# Patient Record
Sex: MALE | Race: WHITE | NOT HISPANIC OR LATINO
[De-identification: names, ages, dates, MRNs, and addresses within clinical notes are randomized per-mention and may not be internally consistent; named-entity substitution may affect disease eponyms.]

---
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
## 2018-03-02 PROBLEM — S46.911D STRAIN OF RIGHT SHOULDER, SUBSEQUENT ENCOUNTER: Status: ACTIVE | Noted: 2018-03-02

## 2018-07-13 PROCEDURE — 87491 CHLMYD TRACH DNA AMP PROBE: CPT | Performed by: PEDIATRICS

## 2018-07-13 PROCEDURE — 87591 N.GONORRHOEAE DNA AMP PROB: CPT | Performed by: PEDIATRICS

## 2018-07-13 PROCEDURE — 86780 TREPONEMA PALLIDUM: CPT | Performed by: PEDIATRICS

## 2018-07-13 PROCEDURE — 87389 HIV-1 AG W/HIV-1&-2 AB AG IA: CPT | Performed by: PEDIATRICS

## 2019-09-17 ENCOUNTER — EMERGENCY (EMERGENCY)
Facility: HOSPITAL | Age: 19
LOS: 1 days | Discharge: HOME | End: 2019-09-17
Attending: EMERGENCY MEDICINE | Admitting: EMERGENCY MEDICINE
Payer: COMMERCIAL

## 2019-09-17 VITALS
DIASTOLIC BLOOD PRESSURE: 86 MMHG | RESPIRATION RATE: 18 BRPM | TEMPERATURE: 97 F | HEART RATE: 96 BPM | SYSTOLIC BLOOD PRESSURE: 119 MMHG | OXYGEN SATURATION: 100 %

## 2019-09-17 VITALS
RESPIRATION RATE: 20 BRPM | SYSTOLIC BLOOD PRESSURE: 123 MMHG | DIASTOLIC BLOOD PRESSURE: 85 MMHG | WEIGHT: 271.17 LBS | HEART RATE: 107 BPM | TEMPERATURE: 99 F | OXYGEN SATURATION: 99 %

## 2019-09-17 PROCEDURE — 73140 X-RAY EXAM OF FINGER(S): CPT | Mod: 26,LT

## 2019-09-17 PROCEDURE — 99283 EMERGENCY DEPT VISIT LOW MDM: CPT | Mod: 25

## 2019-09-17 PROCEDURE — 99283 EMERGENCY DEPT VISIT LOW MDM: CPT

## 2019-09-17 PROCEDURE — 73140 X-RAY EXAM OF FINGER(S): CPT

## 2019-09-17 RX ORDER — BACITRACIN ZINC 500 UNIT/G
1 OINTMENT IN PACKET (EA) TOPICAL ONCE
Refills: 0 | Status: COMPLETED | OUTPATIENT
Start: 2019-09-17 | End: 2019-09-17

## 2019-09-17 RX ORDER — IBUPROFEN 200 MG
600 TABLET ORAL ONCE
Refills: 0 | Status: COMPLETED | OUTPATIENT
Start: 2019-09-17 | End: 2019-09-17

## 2019-09-17 RX ADMIN — Medication 600 MILLIGRAM(S): at 18:24

## 2019-09-17 RX ADMIN — Medication 1 APPLICATION(S): at 18:43

## 2019-09-17 NOTE — ED PROVIDER NOTE - OBJECTIVE STATEMENT
19 y male accompanied to ED by sister, presents with left 3rd finger injury, states bathroom door struck his finger tip today,  has skin avulsion above cuticle, from, no active bleeding.  right hand dominant, utd on tetanus.  took no pain medication before coming to ED .  PMd Dr Samayoa  nonsmoker

## 2019-09-17 NOTE — ED PROVIDER NOTE - PATIENT PORTAL LINK FT
You can access the FollowMyHealth Patient Portal offered by [facility] by registering at the following website: [email or web address]. By joining [facility]’s FollowMyHealth portal, you will also be able to view your health information using other applications (apps) compatible with our system.

## 2019-09-17 NOTE — ED ADULT NURSE NOTE - OBJECTIVE STATEMENT
18 y/o male comes in [facility] from home with complaints of left hand 3rd finger injury. States he was at school and his left 3rd finger got slammed in a door causing a laceration. Denies pain, nausea or vomiting. Plan of care discussed with patient. Comfort and safety maintained. Will continue to monitor.

## 2019-09-17 NOTE — ED ADULT NURSE NOTE - NURSING SKIN WOUND LOCATION #1
Progress Notes by Amanuel Bello MD at 06/29/17 12:31 PM     Author:  Amanuel Bello MD Service:  (none) Author Type:  Physician     Filed:  06/29/17 04:00 PM Encounter Date:  6/29/2017 Status:  Signed     :  Amanuel Bello MD (Physician)            CC:   Chief Complaint     Patient presents with     • Musculoskeletal Problem      left foot pain x 1 wk.  unsure if he injured his foot.[RP1.1T]        Established[RP1.1M] patient    SUBJECTIVE  HPI:Luke Gupta is a 82 year old male who presents today with[RP1.1T] left foot pain.  Been present over the last 1 week.  He does not recall specific injury.  It is worse when he is bearing weight.  Improves with rest.  He is not take anything for symptom relief.[RP1.1M]    His past medical history is significant for:  Past Medical History:     Diagnosis  Date   • Arthritis    • ASTIGMATISM NOS 12/18/1998   • EPIPHORA NOS 1/27/2005   • Fracture of acromion of scapula 11/16/2014   • Pinguecula 1/27/2005   • Shoulder dislocation 12/19/2014   • Stiffness of joint, not elsewhere classified,  shoulder region 1/27/2015       Allergies: Review of patient's allergies indicates no known allergies.    Current Outpatient Prescriptions     Medication  Sig   • Meclizine HCl 12.5 MG TABS Take 1-2 Tabs by mouth 3 (three) times daily. for dizziness   • RANITIDINE  MG OR TABS 1 tab q hs   • HYDROCHLOROTHIAZIDE 25 MG OR TABS 1/2 tab qd   • VALSARTAN 160 MG OR TABS 1/2 tab qd         Review of Systems:    All other systems reviewed are negative    OBJECTIVE  Vitals: /60  Pulse 66  Temp 98 °F (36.7 °C) (Tympanic)  Resp 16  SpO2 99%    Physical Exam:[RP1.1T]  Left foot: There is no obvious bruising swelling redness or deformity.  Patient is some tenderness over the dorsal medial aspect of the foot.  He has a normal dorsal pedal pulse.  His normal capillary refill and sensation of all digits of the foot.  He has no tenderness over the ankle.  He has no tenderness over the  Achilles tendon.    Independently ordered and reviewed an x-ray of the right foot.  Shows no acute fracture or bony abnormality[RP1.1M]    ASSESSMENT/PLAN[RP1.1T]  Foot pain likely secondary to strain.  Had discussion with patient regarding his condition.  At this point recommend close observation symptom and therapy.  He should ice elevate and rest the foot.  Tylenol for pain.  Should see gradual improvement.  If not improving follow-up with his primary doctor.[RP1.1M]  If symptoms should suddenly change or worsen, seek immediate reevaluation with PCP or return to Immediate Care.  The patient verbalized understanding.    Electronically Signed by:    Amanuel Bello MD , 6/29/2017[RP1.1T]        Revision History        User Key Date/Time User Provider Type Action    > RP1.1 06/29/17 04:00 PM Amanuel Bello MD Physician Sign    M - Manual, T - Template             finger

## 2019-09-17 NOTE — ED PROVIDER NOTE - ATTENDING CONTRIBUTION TO CARE
pt c/o injury to distal aspect left middle finger s/p injured by closing door. no other injury. tetanus utd.  wd, wn male, nad, left 3rd finger tender over distal phalanx, skin avulsion prox to cuticle, laceration to nail, distal sensation intact, cap refill<2secs

## 2019-09-17 NOTE — ED PROVIDER NOTE - PROGRESS NOTE DETAILS
bacitracin dsd, finger splint applied to left 3rd finger, given information for Dr Tse, xray no obvious fx,  otc tylenol or motrin as directed for pain

## 2020-01-17 ENCOUNTER — APPOINTMENT (OUTPATIENT)
Dept: OTOLARYNGOLOGY | Facility: CLINIC | Age: 20
End: 2020-01-17
Payer: COMMERCIAL

## 2020-01-17 VITALS
HEART RATE: 87 BPM | BODY MASS INDEX: 30.8 KG/M2 | HEIGHT: 74 IN | SYSTOLIC BLOOD PRESSURE: 126 MMHG | WEIGHT: 240 LBS | DIASTOLIC BLOOD PRESSURE: 84 MMHG

## 2020-01-17 DIAGNOSIS — J32.9 CHRONIC SINUSITIS, UNSPECIFIED: ICD-10-CM

## 2020-01-17 DIAGNOSIS — J33.9 NASAL POLYP, UNSPECIFIED: ICD-10-CM

## 2020-01-17 PROCEDURE — 99203 OFFICE O/P NEW LOW 30 MIN: CPT | Mod: 25

## 2020-01-17 PROCEDURE — 31231 NASAL ENDOSCOPY DX: CPT

## 2020-01-17 RX ORDER — FLUTICASONE PROPIONATE 50 UG/1
50 SPRAY, METERED NASAL
Qty: 1 | Refills: 4 | Status: ACTIVE | COMMUNITY
Start: 2020-01-17 | End: 1900-01-01

## 2020-01-17 NOTE — HISTORY OF PRESENT ILLNESS
[de-identified] : 19M referred by Dr. Landis ([facility]) for sinonasal evaluation. PMH asthma. Hx craniotomy for chiari malformation with Dr. Merida in 2013. Reports severe recurrent sinus infections- estimates 10-12/year. Some improve with antibiotics, other linger for 2-3wk and require a second course. These sinus infections are characterized by severe nasal congestion, bad postnasal drip, anterior rhinorrhea that ranges between a clear and yellow coloration. Denies facial pain/pressure. Some impairment in regard to sense of smell, can only smell strong odors. At baseline, postnasal drip is present. Has tried flonase and nasal saline but he has a hard time tolerating.\par \par He recently Dr. Del Valle for an infection on 12/2 and he recommended a second course of antibiotics which helped improve his symptoms. At that time, there was inflamed L MT with polypoid degeneration.\par \par CT scan from 2012 demonstrated L sphenoethmoid sinusitis. No more recent sinus imaging.

## 2020-01-17 NOTE — PHYSICAL EXAM
[FreeTextEntry1] : overweight [Nasal Endoscopy Performed] : nasal endoscopy was performed, see procedure section for findings [Normal] : orientation to person, place, and time: normal

## 2020-01-17 NOTE — REVIEW OF SYSTEMS
[Heartburn] : heartburn [Hearing Loss] : hearing loss [Negative] : Heme/Lymph [de-identified] : Headache

## 2020-03-06 ENCOUNTER — APPOINTMENT (OUTPATIENT)
Dept: OTOLARYNGOLOGY | Facility: CLINIC | Age: 20
End: 2020-03-06

## 2020-08-11 PROBLEM — S46.911D STRAIN OF RIGHT SHOULDER, SUBSEQUENT ENCOUNTER: Status: RESOLVED | Noted: 2018-03-02 | Resolved: 2020-08-11

## 2021-07-12 ENCOUNTER — LAB REQUISITION (OUTPATIENT)
Dept: LAB | Age: 21
End: 2021-07-12

## 2021-07-12 ENCOUNTER — IMAGING SERVICES (OUTPATIENT)
Dept: GENERAL RADIOLOGY | Age: 21
End: 2021-07-12
Attending: PHYSICIAN ASSISTANT

## 2021-07-12 DIAGNOSIS — Z02.1 PRE-EMPLOYMENT HEALTH SCREENING EXAMINATION: Primary | ICD-10-CM

## 2021-07-12 DIAGNOSIS — Z02.1 ENCOUNTER FOR PRE-EMPLOYMENT EXAMINATION: ICD-10-CM

## 2021-07-12 DIAGNOSIS — Z02.1 PRE-EMPLOYMENT HEALTH SCREENING EXAMINATION: ICD-10-CM

## 2021-07-12 PROCEDURE — 86480 TB TEST CELL IMMUN MEASURE: CPT | Performed by: CLINICAL MEDICAL LABORATORY

## 2021-07-12 PROCEDURE — PSEU9049 QUANTIFERON TB PLUS: Performed by: CLINICAL MEDICAL LABORATORY

## 2021-07-12 PROCEDURE — 71046 X-RAY EXAM CHEST 2 VIEWS: CPT | Performed by: RADIOLOGY

## 2021-07-14 LAB
GAMMA INTERFERON BACKGROUND BLD IA-ACNC: 0.13 IU/ML
M TB IFN-G BLD-IMP: NEGATIVE
M TB IFN-G CD4+ BCKGRND COR BLD-ACNC: 0 IU/ML
M TB IFN-G CD4+CD8+ BCKGRND COR BLD-ACNC: 0 IU/ML
MITOGEN IGNF BCKGRD COR BLD-ACNC: >10 IU/ML

## 2022-01-05 ENCOUNTER — APPOINTMENT (OUTPATIENT)
Dept: DERMATOLOGY | Facility: CLINIC | Age: 22
End: 2022-01-05
Payer: COMMERCIAL

## 2022-01-05 VITALS — HEIGHT: 74 IN | BODY MASS INDEX: 38.26 KG/M2 | WEIGHT: 298.13 LBS

## 2022-01-05 DIAGNOSIS — L24.89 IRRITANT CONTACT DERMATITIS DUE TO OTHER AGENTS: ICD-10-CM

## 2022-01-05 PROCEDURE — 99203 OFFICE O/P NEW LOW 30 MIN: CPT

## 2022-01-05 RX ORDER — CLOBETASOL PROPIONATE 0.5 MG/G
0.05 OINTMENT TOPICAL
Qty: 1 | Refills: 0 | Status: ACTIVE | COMMUNITY
Start: 2022-01-05 | End: 1900-01-01

## 2023-10-31 NOTE — ED PROVIDER NOTE - CARE PLAN
Principal Discharge DX:	Finger injury, left, initial encounter Island Pedicle Flap-Requiring Vessel Identification Text: The defect edges were debeveled with a #15 scalpel blade.  Given the location of the defect, shape of the defect and the proximity to free margins an island pedicle advancement flap was deemed most appropriate.  Using a sterile surgical marker, an appropriate advancement flap was drawn, based on the axial vessel mentioned above, incorporating the defect, outlining the appropriate donor tissue and placing the expected incisions within the relaxed skin tension lines where possible.    The area thus outlined was incised deep to adipose tissue with a #15 scalpel blade.  The skin margins were undermined to an appropriate distance in all directions around the primary defect and laterally outward around the island pedicle utilizing iris scissors.  There was minimal undermining beneath the pedicle flap.

## 2024-01-17 ENCOUNTER — OUTPATIENT (OUTPATIENT)
Dept: OUTPATIENT SERVICES | Facility: HOSPITAL | Age: 24
LOS: 1 days | End: 2024-01-17
Payer: COMMERCIAL

## 2024-01-17 VITALS
DIASTOLIC BLOOD PRESSURE: 84 MMHG | HEART RATE: 68 BPM | TEMPERATURE: 98 F | HEIGHT: 72 IN | OXYGEN SATURATION: 97 % | RESPIRATION RATE: 18 BRPM | WEIGHT: 275.8 LBS | SYSTOLIC BLOOD PRESSURE: 113 MMHG

## 2024-01-17 DIAGNOSIS — J32.1 CHRONIC FRONTAL SINUSITIS: ICD-10-CM

## 2024-01-17 DIAGNOSIS — Z90.89 ACQUIRED ABSENCE OF OTHER ORGANS: Chronic | ICD-10-CM

## 2024-01-17 DIAGNOSIS — J32.9 CHRONIC SINUSITIS, UNSPECIFIED: ICD-10-CM

## 2024-01-17 DIAGNOSIS — J32.0 CHRONIC MAXILLARY SINUSITIS: ICD-10-CM

## 2024-01-17 DIAGNOSIS — Z98.890 OTHER SPECIFIED POSTPROCEDURAL STATES: Chronic | ICD-10-CM

## 2024-01-17 LAB
ANION GAP SERPL CALC-SCNC: 12 MMOL/L — SIGNIFICANT CHANGE UP (ref 5–17)
BUN SERPL-MCNC: 15 MG/DL — SIGNIFICANT CHANGE UP (ref 7–23)
CALCIUM SERPL-MCNC: 9.7 MG/DL — SIGNIFICANT CHANGE UP (ref 8.4–10.5)
CHLORIDE SERPL-SCNC: 103 MMOL/L — SIGNIFICANT CHANGE UP (ref 96–108)
CO2 SERPL-SCNC: 25 MMOL/L — SIGNIFICANT CHANGE UP (ref 22–31)
CREAT SERPL-MCNC: 0.78 MG/DL — SIGNIFICANT CHANGE UP (ref 0.5–1.3)
EGFR: 127 ML/MIN/1.73M2 — SIGNIFICANT CHANGE UP
GLUCOSE SERPL-MCNC: 83 MG/DL — SIGNIFICANT CHANGE UP (ref 70–99)
HCT VFR BLD CALC: 40.7 % — SIGNIFICANT CHANGE UP (ref 39–50)
HGB BLD-MCNC: 13.9 G/DL — SIGNIFICANT CHANGE UP (ref 13–17)
MCHC RBC-ENTMCNC: 30 PG — SIGNIFICANT CHANGE UP (ref 27–34)
MCHC RBC-ENTMCNC: 34.2 GM/DL — SIGNIFICANT CHANGE UP (ref 32–36)
MCV RBC AUTO: 87.7 FL — SIGNIFICANT CHANGE UP (ref 80–100)
NRBC # BLD: 0 /100 WBCS — SIGNIFICANT CHANGE UP (ref 0–0)
PLATELET # BLD AUTO: 274 K/UL — SIGNIFICANT CHANGE UP (ref 150–400)
POTASSIUM SERPL-MCNC: 4 MMOL/L — SIGNIFICANT CHANGE UP (ref 3.5–5.3)
POTASSIUM SERPL-SCNC: 4 MMOL/L — SIGNIFICANT CHANGE UP (ref 3.5–5.3)
RBC # BLD: 4.64 M/UL — SIGNIFICANT CHANGE UP (ref 4.2–5.8)
RBC # FLD: 12.3 % — SIGNIFICANT CHANGE UP (ref 10.3–14.5)
SODIUM SERPL-SCNC: 140 MMOL/L — SIGNIFICANT CHANGE UP (ref 135–145)
WBC # BLD: 8.01 K/UL — SIGNIFICANT CHANGE UP (ref 3.8–10.5)
WBC # FLD AUTO: 8.01 K/UL — SIGNIFICANT CHANGE UP (ref 3.8–10.5)

## 2024-01-17 PROCEDURE — 85027 COMPLETE CBC AUTOMATED: CPT

## 2024-01-17 PROCEDURE — 80048 BASIC METABOLIC PNL TOTAL CA: CPT

## 2024-01-17 PROCEDURE — G0463: CPT

## 2024-01-17 RX ORDER — OMEPRAZOLE 10 MG/1
0 CAPSULE, DELAYED RELEASE ORAL
Qty: 0 | Refills: 0 | DISCHARGE

## 2024-01-17 RX ORDER — SODIUM CHLORIDE 9 MG/ML
3 INJECTION INTRAMUSCULAR; INTRAVENOUS; SUBCUTANEOUS EVERY 8 HOURS
Refills: 0 | Status: DISCONTINUED | OUTPATIENT
Start: 2024-02-06 | End: 2024-02-20

## 2024-01-17 RX ORDER — SODIUM CHLORIDE 9 MG/ML
1000 INJECTION, SOLUTION INTRAVENOUS
Refills: 0 | Status: DISCONTINUED | OUTPATIENT
Start: 2024-02-06 | End: 2024-02-20

## 2024-01-17 NOTE — H&P PST ADULT - NSICDXFAMILYHX_GEN_ALL_CORE_FT
FAMILY HISTORY:  Father  Still living? Yes, Estimated age: Age Unknown  FH: aortic aneurysm, Age at diagnosis: Age Unknown    Mother  Still living? Yes, Estimated age: Age Unknown  FH: type 2 diabetes mellitus, Age at diagnosis: Age Unknown    Grandparent  Still living? No  FH: brain cancer, Age at diagnosis: Age Unknown

## 2024-01-17 NOTE — H&P PST ADULT - PROBLEM SELECTOR PLAN 1
Plan for B/L turbinectomy, B/L maxillary antrostomy w/tissue removal, B/L total ethmoidectomy, B/L excision serena bullosa, B/L balloon dilation of frontal sinuses, med fusion, scope on 2/6/24 with Dr. Landis.   PST labs sent, CBC, BMP  Pre procedure instructions discussed  Pre-op education provided - all questions answered

## 2024-01-17 NOTE — H&P PST ADULT - HISTORY OF PRESENT ILLNESS
Pt is a 24 yo M with PMH autism, asthma, depression, migraines, craniotomy for Chiari malformation with Dr. Merida in 2013, and severe recurrent sinus infections for 10-12 years, treated with multiple course of abx.  Last sinus infection about 2 months.  Sinus infections are characterized by severe nasal congestion, bad postnasal drip, anterior rhinorrhea that ranges between a clear and yellow coloration, impaired sense of smell.  Plan for B/L turbinectomy, B/L maxillary antrostomy w/tissue removal, B/L total ethmoidectomy, B/L excision serena bullosa, B/L balloon dilation of frontal sinuses, med fusion, scope on 2/6/24 with Dr. Landis.   Patient denies any fever, cough, sob, flu like symptoms or travel outside of the [place] in the past 30 days  Pt is a 22 yo M with PMH autism, asthma, depression, migraines, obesity (BMI 37), craniotomy for Chiari malformation with Dr. Merida in 2013, and severe recurrent sinus infections for 10-12 years, treated with multiple course of abx.  Last sinus infection about 2 months.  Sinus infections are characterized by severe nasal congestion, bad postnasal drip, anterior rhinorrhea that ranges between a clear and yellow coloration, impaired sense of smell.  Plan for B/L turbinectomy, B/L maxillary antrostomy w/tissue removal, B/L total ethmoidectomy, B/L excision serena bullosa, B/L balloon dilation of frontal sinuses, med fusion, scope on 2/6/24 with Dr. Landis.   Patient denies any fever, cough, sob, flu like symptoms or travel outside of the [place] in the past 30 days

## 2024-01-17 NOTE — H&P PST ADULT - MUSCULOSKELETAL
Problem: Adult Inpatient Plan of Care  Goal: Plan of Care Review  Outcome: Ongoing, Progressing  Goal: Patient-Specific Goal (Individualized)  Outcome: Ongoing, Progressing  Goal: Absence of Hospital-Acquired Illness or Injury  Outcome: Ongoing, Progressing  Goal: Optimal Comfort and Wellbeing  Outcome: Ongoing, Progressing  Goal: Readiness for Transition of Care  Outcome: Ongoing, Progressing     Problem: Fall Injury Risk  Goal: Absence of Fall and Fall-Related Injury  Outcome: Ongoing, Progressing     Problem: Skin Injury Risk Increased  Goal: Skin Health and Integrity  Outcome: Ongoing, Progressing     Problem: Impaired Wound Healing  Goal: Optimal Wound Healing  Outcome: Ongoing, Progressing     Problem: Oral Intake Inadequate  Goal: Improved Oral Intake  Outcome: Ongoing, Progressing     Problem: Pain Acute  Goal: Acceptable Pain Control and Functional Ability  Outcome: Ongoing, Progressing      details… ROM intact/normal gait/strength 5/5 bilateral upper extremities/strength 5/5 bilateral lower extremities/back exam

## 2024-01-17 NOTE — H&P PST ADULT - NSCAFFEAMTFREQ_GEN_ALL_CORE_SD
Date of service: 



12/17/2018



HISTORY:

 left leg pain.  



PRIORS:

02/02/2016. 



FINDINGS:

2-D, color and duplex Doppler analysis of the lower extremity venous 

circulation using routine protocol from the femoral veins through the 

popliteal veins.



Venous compressibility: Normal. 



Flow and augmentation patterns: Normal. 



Visualized veins upper third of calf: Normal. 



Baker cyst: None. 



There is an enlarged 2.0 x 0.7 x 0.6 cm left inguinal lymph node. 



IMPRESSION:

No sonographic or Doppler evidence for DVT in left lower extremity. 



Enlarged 2.0 cm left inguinal lymph node.  Clinical follow-up is 

advised.
1-2 cups/cans per day

## 2024-01-17 NOTE — H&P PST ADULT - ASSESSMENT
DASI score: 8.9  DASI activity: Performs ADLs, goes to grad school, works at [employer], lifts heavy boxes  Loose teeth or dentures: Denies  Airway: MP 2

## 2024-01-17 NOTE — H&P PST ADULT - NSICDXPASTMEDICALHX_GEN_ALL_CORE_FT
PAST MEDICAL HISTORY:  2019 novel coronavirus disease (COVID-19)     Active asthma     Anxiety and depression     Autism     Chronic eczema     Chronic sinusitis     GERD (gastroesophageal reflux disease)     History of Chiari malformation     Migraines

## 2024-02-05 ENCOUNTER — TRANSCRIPTION ENCOUNTER (OUTPATIENT)
Age: 24
End: 2024-02-05

## 2024-02-06 ENCOUNTER — OUTPATIENT (OUTPATIENT)
Dept: OUTPATIENT SERVICES | Facility: HOSPITAL | Age: 24
LOS: 1 days | End: 2024-02-06
Payer: COMMERCIAL

## 2024-02-06 ENCOUNTER — TRANSCRIPTION ENCOUNTER (OUTPATIENT)
Age: 24
End: 2024-02-06

## 2024-02-06 VITALS
RESPIRATION RATE: 18 BRPM | OXYGEN SATURATION: 99 % | DIASTOLIC BLOOD PRESSURE: 82 MMHG | HEART RATE: 70 BPM | SYSTOLIC BLOOD PRESSURE: 128 MMHG | HEIGHT: 72 IN | TEMPERATURE: 97 F | WEIGHT: 275.8 LBS

## 2024-02-06 VITALS
DIASTOLIC BLOOD PRESSURE: 71 MMHG | OXYGEN SATURATION: 96 % | RESPIRATION RATE: 18 BRPM | SYSTOLIC BLOOD PRESSURE: 129 MMHG | HEART RATE: 70 BPM

## 2024-02-06 DIAGNOSIS — J32.0 CHRONIC MAXILLARY SINUSITIS: ICD-10-CM

## 2024-02-06 DIAGNOSIS — Z90.89 ACQUIRED ABSENCE OF OTHER ORGANS: Chronic | ICD-10-CM

## 2024-02-06 DIAGNOSIS — J32.1 CHRONIC FRONTAL SINUSITIS: ICD-10-CM

## 2024-02-06 DIAGNOSIS — Z98.890 OTHER SPECIFIED POSTPROCEDURAL STATES: Chronic | ICD-10-CM

## 2024-02-06 PROCEDURE — C1889: CPT

## 2024-02-06 PROCEDURE — C9399: CPT

## 2024-02-06 PROCEDURE — 88305 TISSUE EXAM BY PATHOLOGIST: CPT

## 2024-02-06 PROCEDURE — 30140 RESECT INFERIOR TURBINATE: CPT | Mod: 50

## 2024-02-06 PROCEDURE — 61782 SCAN PROC CRANIAL EXTRA: CPT

## 2024-02-06 PROCEDURE — 31240 NSL/SNS NDSC CNCH BULL RESCJ: CPT | Mod: 50

## 2024-02-06 PROCEDURE — 31267 ENDOSCOPY MAXILLARY SINUS: CPT | Mod: RT

## 2024-02-06 PROCEDURE — 88305 TISSUE EXAM BY PATHOLOGIST: CPT | Mod: 26

## 2024-02-06 PROCEDURE — 31255 NSL/SINS NDSC W/TOT ETHMDCT: CPT | Mod: RT

## 2024-02-06 DEVICE — SURGIFOAM PAD 8CM X 12.5CM X 10MM (100): Type: IMPLANTABLE DEVICE | Status: FUNCTIONAL

## 2024-02-06 DEVICE — SURGIFLO MATRIX WITH THROMBIN KIT: Type: IMPLANTABLE DEVICE | Status: FUNCTIONAL

## 2024-02-06 RX ORDER — LIDOCAINE HCL 20 MG/ML
0.2 VIAL (ML) INJECTION ONCE
Refills: 0 | Status: COMPLETED | OUTPATIENT
Start: 2024-02-06 | End: 2024-02-06

## 2024-02-06 RX ORDER — ONDANSETRON 8 MG/1
4 TABLET, FILM COATED ORAL ONCE
Refills: 0 | Status: DISCONTINUED | OUTPATIENT
Start: 2024-02-06 | End: 2024-02-20

## 2024-02-06 RX ORDER — FLUTICASONE PROPIONATE 50 MCG
1 SPRAY, SUSPENSION NASAL
Refills: 0 | DISCHARGE

## 2024-02-06 RX ORDER — OXYCODONE HYDROCHLORIDE 5 MG/1
5 TABLET ORAL ONCE
Refills: 0 | Status: DISCONTINUED | OUTPATIENT
Start: 2024-02-06 | End: 2024-02-06

## 2024-02-06 RX ORDER — SUMATRIPTAN SUCCINATE 4 MG/.5ML
1 INJECTION, SOLUTION SUBCUTANEOUS
Refills: 0 | DISCHARGE

## 2024-02-06 RX ORDER — SERTRALINE 25 MG/1
1 TABLET, FILM COATED ORAL
Refills: 0 | DISCHARGE

## 2024-02-06 RX ORDER — FEXOFENADINE HCL 30 MG
1 TABLET ORAL
Refills: 0 | DISCHARGE

## 2024-02-06 RX ORDER — APREPITANT 80 MG/1
40 CAPSULE ORAL ONCE
Refills: 0 | Status: COMPLETED | OUTPATIENT
Start: 2024-02-06 | End: 2024-02-06

## 2024-02-06 RX ADMIN — SODIUM CHLORIDE 100 MILLILITER(S): 9 INJECTION, SOLUTION INTRAVENOUS at 09:21

## 2024-02-06 RX ADMIN — APREPITANT 40 MILLIGRAM(S): 80 CAPSULE ORAL at 09:21

## 2024-02-06 RX ADMIN — SODIUM CHLORIDE 3 MILLILITER(S): 9 INJECTION INTRAMUSCULAR; INTRAVENOUS; SUBCUTANEOUS at 09:31

## 2024-02-06 NOTE — ASU DISCHARGE PLAN (ADULT/PEDIATRIC) - NURSING INSTRUCTIONS
Next dose of tylenol at/after__06pm__. Do not exceed 4000mg in a 24hour  period. Every 6hours as needed.

## 2024-02-06 NOTE — ASU DISCHARGE PLAN (ADULT/PEDIATRIC) - NS MD DC FALL RISK RISK
For information on Fall & Injury Prevention, visit: [email or web address] OR  [email or web address] OR  https://www.cdc.gov/steadi/patient.html

## 2024-02-06 NOTE — ASU PREOP CHECKLIST - NS ASU TO WHOM HOLDING TO OR
Normal vision: sees adequately in most situations; can see medication labels, newsprint
JIGNA iKmball RN

## 2024-02-06 NOTE — ASU DISCHARGE PLAN (ADULT/PEDIATRIC) - ASU DC SPECIAL INSTRUCTIONSFT
Change gauze pads as needed 2-20 times over night is not uncommon  Start nasal sprays as soon as you go home today Afrin2 x a day x 3 days; Saline miust spray  4  times a day for weeks  Start antibiotics and prednisone tmr  Head of bed 2 pillows  never flat  cough or sneeze keep her mouth open    See me tmr in  my office

## 2024-02-06 NOTE — ASU PATIENT PROFILE, ADULT - FALL HARM RISK - UNIVERSAL INTERVENTIONS
Bed in lowest position, wheels locked, appropriate side rails in place/Call bell, personal items and telephone in reach/Instruct patient to call for assistance before getting out of bed or chair/Non-slip footwear when patient is out of bed/[place] to call system/Physically safe environment - no spills, clutter or unnecessary equipment/Purposeful Proactive Rounding/Room/bathroom lighting operational, light cord in reach

## 2024-02-06 NOTE — ASU PREOP CHECKLIST - HEIGHT IN CM
182.88 intermittently compliant and does not know doses. as per psyckes pt picked up risperdal 2mg BID and trazodone 50mg/day on 4/12/19 (pt reports being prescribed by an ER); on 1/23/2019 he picked up haldol 1mg BID, Lithium 300mg and 600mg, propanolol 20mg.  pt reports currently taking risperdal, li and trazodone every few days on/off.

## 2024-02-12 LAB — SURGICAL PATHOLOGY STUDY: SIGNIFICANT CHANGE UP

## 2024-10-09 ENCOUNTER — APPOINTMENT (OUTPATIENT)
Dept: PULMONOLOGY | Facility: CLINIC | Age: 24
End: 2024-10-09

## (undated) DEVICE — DRAPE MAYO STAND 30"

## (undated) DEVICE — VENODYNE/SCD SLEEVE CALF MEDIUM

## (undated) DEVICE — APPLICATOR ESWAB 1ML LIQUID AMIES REG

## (undated) DEVICE — S&N ARTHROCARE ENT WAND REFLEX ULTRA 45

## (undated) DEVICE — Device

## (undated) DEVICE — BASIN AMBULATORY

## (undated) DEVICE — DRAPE INSTRUMENT POUCH 6.75" X 11"

## (undated) DEVICE — WARMING BLANKET LOWER ADULT

## (undated) DEVICE — TUBING SUCTION 20FT

## (undated) DEVICE — SOL ANTI FOG

## (undated) DEVICE — ACCLARENT SET INFLATION DEVICE

## (undated) DEVICE — BLADE MEDTRONIC ENT TRICUT NON-ROTATABLE STRAIGHT 4MM X 13CM

## (undated) DEVICE — SYR LUER LOK 3CC

## (undated) DEVICE — SUT PLAIN GUT 4-0 18" CT-1

## (undated) DEVICE — SOL IRR POUR NS 0.9% 500ML

## (undated) DEVICE — STRYKER MALLEABLE SUCTION MEDIUM STANDARD

## (undated) DEVICE — MEDTRONIC AXIEM PATIENT TRACKER NON-INVASIVE

## (undated) DEVICE — BLADE SCALPEL SAFETYLOCK #15

## (undated) DEVICE — APPLICATOR Q TIP 6" WOOD STEM

## (undated) DEVICE — SUT MONOSOF 3-0 18" C-14

## (undated) DEVICE — SUT CHROMIC 4-0 18" G-2

## (undated) DEVICE — DRSG SPLINT INTRA NASAL .25MM STANDARD THIN

## (undated) DEVICE — NDL HYPO REGULAR BEVEL 25G X 1.5" (BLUE)

## (undated) DEVICE — POSITIONER FOAM EGG CRATE ULNAR 2PCS (PINK)

## (undated) DEVICE — SUT CHROMIC GUT 4-0 18" P-13

## (undated) DEVICE — SOL IRR POUR H2O 250ML

## (undated) DEVICE — MARKING PEN W RULER

## (undated) DEVICE — DRAPE MAGNETIC INSTRUMENT MEDIUM

## (undated) DEVICE — MEDTRONIC INSTRUMENT TRACKER ENT

## (undated) DEVICE — CATH IV SAFE INSYTE 14G X 1.75" (ORANGE)

## (undated) DEVICE — TUBING IRRIGATION STRAIGHT SHOT

## (undated) DEVICE — SPECIMEN CONTAINER 100ML

## (undated) DEVICE — VAGINAL PACKING 2 X 6"

## (undated) DEVICE — MEDICATION LABELS W MARKER

## (undated) DEVICE — MEDTRONIC INFLATOR KIT FOR NUVENT EM SINUS DILATION SYSTEM

## (undated) DEVICE — PACK NASAL

## (undated) DEVICE — BLADE MEDTRONIC ENT FUSION TRICUT ROTATABLE STRAIGHT 4MM X 13CM

## (undated) DEVICE — SYR LUER LOK 10CC